# Patient Record
Sex: FEMALE | Race: WHITE | NOT HISPANIC OR LATINO
[De-identification: names, ages, dates, MRNs, and addresses within clinical notes are randomized per-mention and may not be internally consistent; named-entity substitution may affect disease eponyms.]

---

## 2023-12-19 PROBLEM — Z00.00 ENCOUNTER FOR PREVENTIVE HEALTH EXAMINATION: Status: ACTIVE | Noted: 2023-12-19

## 2024-01-01 DIAGNOSIS — Z86.59 PERSONAL HISTORY OF OTHER MENTAL AND BEHAVIORAL DISORDERS: ICD-10-CM

## 2024-01-16 DIAGNOSIS — I25.10 ATHEROSCLEROTIC HEART DISEASE OF NATIVE CORONARY ARTERY W/OUT ANGINA PECTORIS: ICD-10-CM

## 2024-01-16 DIAGNOSIS — I25.84 ATHEROSCLEROTIC HEART DISEASE OF NATIVE CORONARY ARTERY W/OUT ANGINA PECTORIS: ICD-10-CM

## 2024-03-06 ENCOUNTER — APPOINTMENT (OUTPATIENT)
Dept: CARDIOLOGY | Facility: CLINIC | Age: 69
End: 2024-03-06
Payer: MEDICARE

## 2024-03-06 VITALS
SYSTOLIC BLOOD PRESSURE: 138 MMHG | HEART RATE: 74 BPM | WEIGHT: 165 LBS | DIASTOLIC BLOOD PRESSURE: 80 MMHG | OXYGEN SATURATION: 100 %

## 2024-03-06 DIAGNOSIS — Z87.39 PERSONAL HISTORY OF OTHER DISEASES OF THE MUSCULOSKELETAL SYSTEM AND CONNECTIVE TISSUE: ICD-10-CM

## 2024-03-06 DIAGNOSIS — Z86.59 PERSONAL HISTORY OF OTHER MENTAL AND BEHAVIORAL DISORDERS: ICD-10-CM

## 2024-03-06 DIAGNOSIS — Z82.49 FAMILY HISTORY OF ISCHEMIC HEART DISEASE AND OTHER DISEASES OF THE CIRCULATORY SYSTEM: ICD-10-CM

## 2024-03-06 DIAGNOSIS — F17.200 NICOTINE DEPENDENCE, UNSPECIFIED, UNCOMPLICATED: ICD-10-CM

## 2024-03-06 DIAGNOSIS — Z86.39 PERSONAL HISTORY OF OTHER ENDOCRINE, NUTRITIONAL AND METABOLIC DISEASE: ICD-10-CM

## 2024-03-06 DIAGNOSIS — Z87.09 PERSONAL HISTORY OF OTHER DISEASES OF THE RESPIRATORY SYSTEM: ICD-10-CM

## 2024-03-06 PROCEDURE — 99203 OFFICE O/P NEW LOW 30 MIN: CPT | Mod: 25

## 2024-03-06 PROCEDURE — 93000 ELECTROCARDIOGRAM COMPLETE: CPT

## 2024-03-06 PROCEDURE — 99406 BEHAV CHNG SMOKING 3-10 MIN: CPT

## 2024-03-08 ENCOUNTER — NON-APPOINTMENT (OUTPATIENT)
Age: 69
End: 2024-03-08

## 2024-03-08 PROBLEM — F17.200 CURRENT SMOKER: Status: ACTIVE | Noted: 2024-03-08

## 2024-03-08 PROBLEM — Z87.09 HISTORY OF CHRONIC OBSTRUCTIVE LUNG DISEASE: Status: RESOLVED | Noted: 2024-03-08 | Resolved: 2024-03-08

## 2024-03-08 PROBLEM — Z86.59 HISTORY OF ANXIETY: Status: RESOLVED | Noted: 2024-03-08 | Resolved: 2024-03-08

## 2024-03-08 PROBLEM — Z86.39 HISTORY OF HYPOTHYROIDISM: Status: RESOLVED | Noted: 2024-03-08 | Resolved: 2024-03-08

## 2024-03-08 PROBLEM — Z82.49 FAMILY HISTORY OF ACUTE MYOCARDIAL INFARCTION: Status: ACTIVE | Noted: 2024-03-08

## 2024-03-08 PROBLEM — Z86.39 HISTORY OF HYPERLIPIDEMIA: Status: RESOLVED | Noted: 2024-03-08 | Resolved: 2024-03-08

## 2024-03-08 PROBLEM — Z87.39 HISTORY OF OSTEOPOROSIS: Status: RESOLVED | Noted: 2024-03-08 | Resolved: 2024-03-08

## 2024-03-08 PROBLEM — Z82.49 FAMILY HISTORY OF CORONARY ARTERY DISEASE: Status: ACTIVE | Noted: 2024-03-08

## 2024-03-08 RX ORDER — PANTOPRAZOLE 40 MG/1
TABLET, DELAYED RELEASE ORAL
Refills: 0 | Status: ACTIVE | COMMUNITY

## 2024-03-08 RX ORDER — BREXPIPRAZOLE 1 MG/1
1 TABLET ORAL
Refills: 0 | Status: ACTIVE | COMMUNITY

## 2024-03-08 RX ORDER — GLUCOSAMINE HCL/CHONDROITIN SU 500-400 MG
3 CAPSULE ORAL
Refills: 0 | Status: ACTIVE | COMMUNITY

## 2024-03-08 RX ORDER — ALBUTEROL SULFATE 90 UG/1
108 (90 BASE) INHALANT RESPIRATORY (INHALATION)
Refills: 0 | Status: ACTIVE | COMMUNITY

## 2024-03-08 RX ORDER — PROPRANOLOL HYDROCHLORIDE 40 MG/1
50 TABLET ORAL
Refills: 0 | Status: ACTIVE | COMMUNITY

## 2024-03-08 RX ORDER — CHROMIUM 200 MCG
1000 TABLET ORAL
Refills: 0 | Status: ACTIVE | COMMUNITY

## 2024-03-08 RX ORDER — ROSUVASTATIN CALCIUM 5 MG/1
TABLET, FILM COATED ORAL
Refills: 0 | Status: ACTIVE | COMMUNITY

## 2024-03-08 RX ORDER — TRAMADOL HYDROCHLORIDE 25 MG/1
TABLET, COATED ORAL
Refills: 0 | Status: ACTIVE | COMMUNITY

## 2024-03-08 RX ORDER — LORAZEPAM 1 MG/1
1 TABLET ORAL
Refills: 0 | Status: ACTIVE | COMMUNITY

## 2024-03-08 RX ORDER — LEVOTHYROXINE SODIUM 137 UG/1
TABLET ORAL
Refills: 0 | Status: ACTIVE | COMMUNITY

## 2024-03-08 RX ORDER — ATENOLOL 25 MG/1
25 TABLET ORAL
Qty: 5 | Refills: 0 | Status: ACTIVE | COMMUNITY
Start: 2024-03-08 | End: 1900-01-01

## 2024-03-08 RX ORDER — ASCORBIC ACID 500 MG
TABLET ORAL
Refills: 0 | Status: ACTIVE | COMMUNITY

## 2024-03-08 RX ORDER — FLUTICASONE FUROATE, UMECLIDINIUM BROMIDE AND VILANTEROL TRIFENATATE 200; 62.5; 25 UG/1; UG/1; UG/1
POWDER RESPIRATORY (INHALATION)
Refills: 0 | Status: ACTIVE | COMMUNITY

## 2024-03-08 RX ORDER — VITAMIN E 268 MG
CAPSULE ORAL
Refills: 0 | Status: ACTIVE | COMMUNITY

## 2024-03-08 RX ORDER — VENLAFAXINE HCL 50 MG
TABLET ORAL
Refills: 0 | Status: ACTIVE | COMMUNITY

## 2024-03-08 RX ORDER — FOLIC ACID 1 MG/1
1 TABLET ORAL
Refills: 0 | Status: ACTIVE | COMMUNITY

## 2024-03-08 RX ORDER — FLUTICASONE PROPIONATE 50 UG/1
50 SPRAY, METERED NASAL
Refills: 0 | Status: ACTIVE | COMMUNITY

## 2024-03-08 NOTE — DISCUSSION/SUMMARY
[FreeTextEntry1] : Chest pain The working diagnosis is chest pain secondary to reflux esophagitis.  The history is consistent with this diagnosis. The resting 3/6/24 electrocardiogram shows no evidence of myocardial ischemia or infarction. However the patient has multiple risk factors for the development of atherosclerotic heart disease.  A  12/23 chest CAT scan showed coronary artery calcifications.  As such, the differential diagnosis would include myocardial ischemia/atherosclerotic heart disease.  A noninvasive ischemic evaluation would be helpful for management decisions.  I have recommended the following: a. CT coronary angiogram b .Echocardiogram   Cardiomegaly An 11/23 chest x-ray and evaluation of a cough revealed mild cardiomegaly.  The presence of cardiomegaly would be suggestive of left ventricular systolic dysfunction.  However the history and physical findings do not support this diagnosis.  Echocardiography will be helpful to better assess heart   dimensions and function.  I have recommended following: Echocardiogram CT coronary angiogram   Coronary artery calcifications.    The 12/23 chest CAT scan showed evidence of coronary artery calcifications.  The working diagnosis is atherosclerotic heart disease with preserved left ventricular systolic function.  The patient has multiple risk factors for the development of ischemic heart disease.  I have recommended the following: Echocardiogram CT coronary angiogram.   Hyperlipidemia Hyperlipidemia represents a risk factor for progressive atherosclerotic disease.  The target LDL level for primary prevention is about 100.  If coronary disease is documented by noninvasive studies the target LDL level would then become 70/minute.  The dose of rosuvastatin may be increased if required to obtain optimal levels. The most recent lipid levels are not available for review.  Nonpharmacological therapy, specifically diet and exercise are emphasized  as major aspects of treatment.  I have recommended the following: Low-fat low-cholesterol heart healthy diet.  Regular aerobic exercise Continue the present medical regimen Target LDL level to about 100 (or 70 if atherosclerotic disease is documented) Increase rosuvastatin dose if required to obtain optimal levels as discussed above   Shortness of breath The working diagnosis is dyspnea secondary to chronic obstructive lung disease and smoking.  The history is compelling for this diagnosis.  The differential diagnosis would include left ventricular systolic dysfunction.  The history and physical findings do not support a diagnosis of pulmonary venous congestion/heart failure.  I have recommended the following: Echocardiogram   Nicotine dependence Smoking cigarettes exacerbates Geraldo's  cardiovascular issues.  Complete cessation of smoking is advised.     The diagnosis, prognosis, risks, options and alternatives were explained at length to the patient.  All questions were answered issues discussed included cardiomegaly nicotine dependence atherosclerotic heart disease coronary artery calcifications hyperlipidemia and  noninvasive cardiac testing

## 2024-03-08 NOTE — PHYSICAL EXAM
[Normal Conjunctiva] : normal conjunctiva [Normal S1, S2] : normal S1, S2 [Soft] : abdomen soft [Non Tender] : non-tender [Normal Gait] : normal gait [Normal Bowel Sounds] : normal bowel sounds [No Rash] : no rash [No Focal Deficits] : no focal deficits [de-identified] : Appears in no distress lying flat [de-identified] : No neck vein distention.  No carotid bruit [de-identified] : No murmur.  No gallop.  No diastolic sounds. [de-identified] :  Poor air exchange.  No rales.  No wheezing [de-identified] : Full range of motion [de-identified] : No peripheral edema.  Dorsalis pedis pulses +2 bilaterally no ulcerations [de-identified] : pleasant

## 2024-03-08 NOTE — HISTORY OF PRESENT ILLNESS
[FreeTextEntry1] : 69-year-old female Cardiology consultation requested by Dr. ELDER Rose because of risk factors for atherosclerotic heart disease and cardiomegaly on a chest x-ray.   Ms. Roldan has no known heart disease.  There is no history of a myocardial infarction angina or congestive heart failure.  In 11/23 She was seen by Dr. ELDER Rose at urgent care. because of a cough. A chest x-ray report indicated mild cardiomegaly.  A 12/23 chest CAT scan in  evaluation of a pulmonary nodule showed coronary artery calcifications.  A lingular 0.5 cm solid lung nodule was reported.  Ms. Roldan carries a diagnosis of chronic obstructive lung disease and smokes 1 pack of cigarettes daily.  She continues to smoke at the present time.  There is no history of diabetes.  There is a prior history of  hyperlipidemia  Geraldo experiences chest discomfort at rest which she attributes to gastroesophageal reflux/hiatal hernia.  Ms. Roldan reports dyspnea on exertion..  Ms. Roldan presents today for cardiovascular evaluation

## 2024-04-19 ENCOUNTER — APPOINTMENT (OUTPATIENT)
Dept: THORACIC SURGERY | Facility: CLINIC | Age: 69
End: 2024-04-19
Payer: MEDICARE

## 2024-04-19 VITALS — HEIGHT: 63.5 IN | WEIGHT: 169 LBS | BODY MASS INDEX: 29.57 KG/M2

## 2024-04-19 PROCEDURE — ACP01: CPT | Mod: NC

## 2024-05-04 ENCOUNTER — TRANSCRIPTION ENCOUNTER (OUTPATIENT)
Age: 69
End: 2024-05-04

## 2024-05-17 ENCOUNTER — APPOINTMENT (OUTPATIENT)
Dept: CARDIOLOGY | Facility: CLINIC | Age: 69
End: 2024-05-17
Payer: MEDICARE

## 2024-05-17 PROCEDURE — 93306 TTE W/DOPPLER COMPLETE: CPT

## 2024-05-28 ENCOUNTER — APPOINTMENT (OUTPATIENT)
Dept: CARDIOLOGY | Facility: CLINIC | Age: 69
End: 2024-05-28
Payer: MEDICARE

## 2024-05-28 VITALS
WEIGHT: 170 LBS | HEART RATE: 93 BPM | OXYGEN SATURATION: 97 % | SYSTOLIC BLOOD PRESSURE: 129 MMHG | DIASTOLIC BLOOD PRESSURE: 73 MMHG | BODY MASS INDEX: 29.64 KG/M2

## 2024-05-28 DIAGNOSIS — I51.7 CARDIOMEGALY: ICD-10-CM

## 2024-05-28 DIAGNOSIS — R07.9 CHEST PAIN, UNSPECIFIED: ICD-10-CM

## 2024-05-28 DIAGNOSIS — E78.5 HYPERLIPIDEMIA, UNSPECIFIED: ICD-10-CM

## 2024-05-28 DIAGNOSIS — I25.10 ATHEROSCLEROTIC HEART DISEASE OF NATIVE CORONARY ARTERY W/OUT ANGINA PECTORIS: ICD-10-CM

## 2024-05-28 DIAGNOSIS — I25.84 ATHEROSCLEROTIC HEART DISEASE OF NATIVE CORONARY ARTERY W/OUT ANGINA PECTORIS: ICD-10-CM

## 2024-05-28 DIAGNOSIS — I38 ENDOCARDITIS, VALVE UNSPECIFIED: ICD-10-CM

## 2024-05-28 DIAGNOSIS — R06.02 SHORTNESS OF BREATH: ICD-10-CM

## 2024-05-28 DIAGNOSIS — Z86.79 PERSONAL HISTORY OF OTHER DISEASES OF THE CIRCULATORY SYSTEM: ICD-10-CM

## 2024-05-28 DIAGNOSIS — F17.209 NICOTINE DEPENDENCE, UNSPECIFIED, WITH UNSPECIFIED NICOTINE-INDUCED DISORDERS: ICD-10-CM

## 2024-05-28 DIAGNOSIS — E66.3 OVERWEIGHT: ICD-10-CM

## 2024-05-28 PROCEDURE — 99406 BEHAV CHNG SMOKING 3-10 MIN: CPT

## 2024-05-28 PROCEDURE — 99213 OFFICE O/P EST LOW 20 MIN: CPT | Mod: 25

## 2024-06-02 PROBLEM — I25.10 CORONARY ARTERY CALCIFICATION: Status: ACTIVE | Noted: 2024-03-05

## 2024-06-02 PROBLEM — E78.5 HYPERLIPIDEMIA: Status: ACTIVE | Noted: 2024-03-06

## 2024-06-02 PROBLEM — I51.7 CARDIOMEGALY: Status: ACTIVE | Noted: 2024-03-06

## 2024-06-02 PROBLEM — I38 VALVULAR HEART DISEASE: Status: ACTIVE | Noted: 2024-06-02

## 2024-06-02 PROBLEM — E66.3 OVERWEIGHT: Status: RESOLVED | Noted: 2024-06-02 | Resolved: 2024-06-02

## 2024-06-02 PROBLEM — Z86.79 HISTORY OF VALVULAR HEART DISEASE: Status: RESOLVED | Noted: 2024-06-02 | Resolved: 2024-06-02

## 2024-06-02 PROBLEM — R06.02 SHORTNESS OF BREATH: Status: ACTIVE | Noted: 2024-03-06

## 2024-06-02 PROBLEM — F17.209 NICOTINE DEPENDENCE WITH NICOTINE-INDUCED DISORDER, UNSPECIFIED NICOTINE PRODUCT TYPE: Status: ACTIVE | Noted: 2024-03-08

## 2024-06-02 PROBLEM — R07.9 CHEST PAIN, UNSPECIFIED TYPE: Status: ACTIVE | Noted: 2024-03-06

## 2024-06-02 NOTE — DISCUSSION/SUMMARY
[FreeTextEntry1] : Chest pain The working diagnosis is chest pain secondary to reflux esophagitis.  The history is consistent with this diagnosis. The resting 3/6/24 electrocardiogram shows no evidence of myocardial ischemia or infarction. However the patient has multiple risk factors for the development of atherosclerotic heart disease.  A  12/23 chest CAT scan showed coronary artery calcifications.  As such, the differential diagnosis would include myocardial ischemia/atherosclerotic heart disease.  A noninvasive ischemic evaluation would be helpful for management decisions.  I have recommended the following: a. CT coronary angiogram    Cardiomegaly An 11/23 chest x-ray in  evaluation of a cough revealed mild cardiomegaly.  The presence of cardiomegaly would be suggestive of left ventricular systolic dysfunction.  However the history and physical findings do not support this diagnosis. The 5/24 echocardiogram confirmed normal left ventricular size and function.  The left ventricular end-diastolic dimension was 4.7 cm.  The left ventricular ejection fraction was 60%..  I have recommended following: No further cardiac testing for this problem    Coronary artery calcifications.   The 12/23 chest CAT scan showed evidence of coronary artery calcifications.  The working diagnosis is atherosclerotic heart disease with preserved left ventricular systolic function.  The patient has multiple risk factors for the development of ischemic heart disease.  I have recommended the following: CT coronary angiogram.    Hyperlipidemia Hyperlipidemia represents a risk factor for progressive atherosclerotic disease.  The target LDL level for primary prevention is about 100.  If coronary disease is documented by noninvasive studies the target LDL level would then become 70/minute.  The dose of rosuvastatin may be increased if required to obtain optimal levels. The most recent lipid levels are not available for review.  Nonpharmacological therapy, specifically diet and exercise are emphasized  as major aspects of treatment.  I have recommended the following: Low-fat low-cholesterol heart healthy diet.  Regular aerobic exercise Continue the present medical regimen Target LDL level to about 100 (or 70 if atherosclerotic disease is documented) Increase rosuvastatin dose if required to obtain optimal levels as discussed above Lipid profile laboratory study is ordered   Shortness of breath The working diagnosis is dyspnea secondary to chronic obstructive lung disease and smoking.  The history is compelling for this diagnosis.  The differential diagnosis would include  myocardial ischemia/atherosclerotic heart disease.  .  The history and physical findings do not support a diagnosis of pulmonary venous congestion/heart failure.  I have recommended the following: CT coronary angiogram  (see chest pain entry above)   Nicotine dependence Smoking cigarettes exacerbates Geraldo's  cardiovascular issues.  Complete cessation of smoking is advised.   Valvular heart disease The 5/24 echocardiogram revealed mild mitral regurgitation.  The pulmonary artery systolic pressure was normal at 25 mmHg.  Left ventricular ejection fraction was 60%.  The present cardiac physical examination is not suggestive of hemodynamically significant valvular pathology.  I have recommended the following: No further cardiac testing for this problem  Overweight Being overweight exacerbates Geraldo's cardiovascular issues.  Today Ms. Roldan is 5 feet 4 inches tall and weighs 170 pounds.  Diet exercise and weight loss are advised  The diagnosis, prognosis, risks, options and alternatives were explained at length to the patient.  All questions were answered issues discussed included cardiomegaly nicotine dependence atherosclerotic heart disease coronary artery calcifications hyperlipidemia and  noninvasive cardiac testing.  Counseling and/or coordination of care Time was a significant factor for this patient encounter.  Total time spent with the patient was 25 minutes.  Greater than 50% of the time was devoted to counseling and/or coordination of care

## 2024-06-02 NOTE — HISTORY OF PRESENT ILLNESS
[FreeTextEntry1] : 69-year-old female Routine follow-up for multiple risk factors for development of atherosclerotic heart disease chest pain hyperlipidemia nicotine dependence and shortness of breath..  Ms. Roldan complains of anterior chest discomfort described as a "burning sensation" which she ascribes to "my hiatal hernia."  No exertional chest pain.

## 2024-07-23 ENCOUNTER — LABORATORY RESULT (OUTPATIENT)
Age: 69
End: 2024-07-23

## 2024-07-31 ENCOUNTER — RESULT REVIEW (OUTPATIENT)
Age: 69
End: 2024-07-31

## 2024-08-03 DIAGNOSIS — I25.10 ATHEROSCLEROTIC HEART DISEASE OF NATIVE CORONARY ARTERY W/OUT ANGINA PECTORIS: ICD-10-CM

## 2024-08-03 DIAGNOSIS — Z86.79 PERSONAL HISTORY OF OTHER DISEASES OF THE CIRCULATORY SYSTEM: ICD-10-CM

## 2024-08-27 ENCOUNTER — APPOINTMENT (OUTPATIENT)
Dept: CARDIOLOGY | Facility: CLINIC | Age: 69
End: 2024-08-27

## 2024-08-27 VITALS
BODY MASS INDEX: 30.12 KG/M2 | HEIGHT: 63 IN | HEART RATE: 69 BPM | SYSTOLIC BLOOD PRESSURE: 120 MMHG | OXYGEN SATURATION: 97 % | DIASTOLIC BLOOD PRESSURE: 77 MMHG | WEIGHT: 170 LBS

## 2024-08-27 DIAGNOSIS — Z87.891 PERSONAL HISTORY OF NICOTINE DEPENDENCE: ICD-10-CM

## 2024-08-27 DIAGNOSIS — R06.02 SHORTNESS OF BREATH: ICD-10-CM

## 2024-08-27 DIAGNOSIS — I38 ENDOCARDITIS, VALVE UNSPECIFIED: ICD-10-CM

## 2024-08-27 DIAGNOSIS — E78.5 HYPERLIPIDEMIA, UNSPECIFIED: ICD-10-CM

## 2024-08-27 DIAGNOSIS — F17.209 NICOTINE DEPENDENCE, UNSPECIFIED, WITH UNSPECIFIED NICOTINE-INDUCED DISORDERS: ICD-10-CM

## 2024-08-27 DIAGNOSIS — E66.3 OVERWEIGHT: ICD-10-CM

## 2024-08-27 DIAGNOSIS — I25.10 ATHEROSCLEROTIC HEART DISEASE OF NATIVE CORONARY ARTERY W/OUT ANGINA PECTORIS: ICD-10-CM

## 2024-08-27 DIAGNOSIS — I51.7 CARDIOMEGALY: ICD-10-CM

## 2024-08-27 DIAGNOSIS — Z86.79 PERSONAL HISTORY OF OTHER DISEASES OF THE CIRCULATORY SYSTEM: ICD-10-CM

## 2024-08-27 PROCEDURE — 99406 BEHAV CHNG SMOKING 3-10 MIN: CPT

## 2024-08-27 PROCEDURE — 99214 OFFICE O/P EST MOD 30 MIN: CPT | Mod: 25

## 2024-08-28 PROBLEM — I25.10 CORONARY ARTERY CALCIFICATION: Status: RESOLVED | Noted: 2024-01-06 | Resolved: 2024-08-28

## 2024-08-28 PROBLEM — I25.10 CORONARY ARTERY DISEASE: Status: ACTIVE | Noted: 2024-08-28

## 2024-08-28 PROBLEM — Z87.891 HISTORY OF NICOTINE DEPENDENCE: Status: RESOLVED | Noted: 2024-08-28 | Resolved: 2024-08-28

## 2024-08-28 PROBLEM — Z86.79 HISTORY OF CORONARY ARTERY DISEASE: Status: RESOLVED | Noted: 2024-08-03 | Resolved: 2024-08-28

## 2024-08-28 PROBLEM — E66.3 OVERWEIGHT: Status: ACTIVE | Noted: 2024-08-28

## 2024-08-28 RX ORDER — ASPIRIN 325 MG/1
TABLET, FILM COATED ORAL
Refills: 0 | Status: ACTIVE | COMMUNITY

## 2024-08-28 NOTE — DISCUSSION/SUMMARY
[FreeTextEntry1] : Atherosclerotic heart disease Atherosclerotic heart disease is stable.  A 7/24 CT coronary angiogram study revealed a total coronary calcium score of 382 Agatston units percentile rank 91.  The left main had a less than 25% stenosis.  The LAD the proximal and mid 1-29% The left circumflex was patent.  The RCA had proximal mid and distal 30-49% lesions. The resting 3/24 electrocardiogram shows no evidence of myocardial ischemia or infarction. Left ventricular systolic function is normal as reflected by left ventricular ejection fraction of 60% on the 5/24 echocardiogram. In view of the lack of angina and above noninvasive studies continued medical management is indicated at this time.  The prognosis is good .  Measures to control modifiable risk factors for the development of atherosclerotic disease will be important in long-term management.   I have recommended the following Risk factor modification as discussed above with particular attention to smoking cessation Continue the  present medical regimen Further cardiac testing is not required at this time    Cardiomegaly An 11/23 chest x-ray in  evaluation of a cough revealed mild cardiomegaly.  The presence of cardiomegaly would be suggestive of left ventricular systolic dysfunction.  However the history and physical findings do not support this diagnosis. The 5/24 echocardiogram confirmed normal left ventricular size and function.  The left ventricular end-diastolic dimension was 4.7 cm.  The left ventricular ejection fraction was 60%..  I have recommended following: No further cardiac testing for this problem    .    Hyperlipidemia Hyperlipidemia represents a risk factor for progressive atherosclerotic disease.  The target LDL level with known atherosclerotic heart disease  is about  70.  In 7/24 the serum triglyceride level was 163 cholesterol 199 HDL 65 and .  The dose of rosuvastatin was then increased from 5 mg/day to the present 10 mg/day.  The dose of rosuvastatin may be further increased if required to obtain optimal levels .  Nonpharmacological therapy, specifically diet and exercise are emphasized  as major aspects of treatment.  I have recommended the following: Low-fat low-cholesterol heart healthy diet.  Regular aerobic exercise Continue the present medical regimen Target LDL level to about  70  as noted above Increase rosuvastatin dose if required to obtain optimal levels as discussed above Routine laboratory studies including lipid profile through primary care    Shortness of breath The working diagnosis is dyspnea secondary to chronic obstructive lung disease and smoking.  The history is compelling for this diagnosis.  The differential diagnosis would include  myocardial ischemia/atherosclerotic heart disease.  .  The history and physical findings do not support a diagnosis of pulmonary venous congestion/heart failure.  I have recommended the following: Complete cessation of smoking Follow-up with pulmonary medicine Dr. Lutz   Nicotine dependence Smoking cigarettes exacerbates Geraldo's  cardiovascular issues.  Complete cessation of smoking is advised.   Valvular heart disease The 5/24 echocardiogram revealed mild mitral regurgitation.  The pulmonary artery systolic pressure was normal at 25 mmHg.  Left ventricular ejection fraction was 60%.  The present cardiac physical examination is not suggestive of hemodynamically significant valvular pathology.  I have recommended the following: No further cardiac testing for this problem  Overweight Being overweight exacerbates Geraldo's cardiovascular issues.  Today Ms. Roldan is 5 feet 4 inches tall and weighs 168 pounds.  Diet exercise and weight loss are advised   The diagnosis, prognosis, risks, options and alternatives were explained at length to the patient.  All questions were answered issues discussed included cardiomegaly nicotine dependence atherosclerotic heart disease coronary artery calcifications hyperlipidemia and  noninvasive cardiac testing.  Counseling and/or coordination of care Time was a significant factor for this patient encounter.  Total time spent with the patient was  30  minutes.  Greater than 50% of the time was devoted to counseling and/or coordination of care

## 2024-08-28 NOTE — PHYSICAL EXAM
[Normal Conjunctiva] : normal conjunctiva [Normal S1, S2] : normal S1, S2 [Soft] : abdomen soft [Non Tender] : non-tender [Normal Bowel Sounds] : normal bowel sounds [Normal Gait] : normal gait [No Rash] : no rash [No Focal Deficits] : no focal deficits [de-identified] : Appears in no distress lying flat [de-identified] : No murmur.  No gallop.  No diastolic sounds. [de-identified] : No neck vein distention.  No carotid bruit [de-identified] :  Poor air exchange.  No rales.  No wheezing [de-identified] : Full range of motion [de-identified] : No peripheral edema.  Dorsalis pedis pulses +2 bilaterally no ulcerations [de-identified] : pleasant

## 2024-08-28 NOTE — PHYSICAL EXAM
[Normal Conjunctiva] : normal conjunctiva [Normal S1, S2] : normal S1, S2 [Soft] : abdomen soft [Non Tender] : non-tender [Normal Bowel Sounds] : normal bowel sounds [Normal Gait] : normal gait [No Rash] : no rash [No Focal Deficits] : no focal deficits [de-identified] : Appears in no distress lying flat [de-identified] : No neck vein distention.  No carotid bruit [de-identified] : No murmur.  No gallop.  No diastolic sounds. [de-identified] :  Poor air exchange.  No rales.  No wheezing [de-identified] : Full range of motion [de-identified] : No peripheral edema.  Dorsalis pedis pulses +2 bilaterally no ulcerations [de-identified] : pleasant

## 2024-08-28 NOTE — HISTORY OF PRESENT ILLNESS
[FreeTextEntry1] : 69-year-old female Routine follow-up for atherosclerotic heart disease nicotine dependence hyperlipidemia "I feel okay."  Geraldo denies exertional chest pain shortness of breath at rest palpitations or syncope.   Dyspnea on exertion is not progressive severe or different from in the past. She continues to struggle with her nicotine dependence but has been unable to stop smoking.

## 2025-02-18 ENCOUNTER — NON-APPOINTMENT (OUTPATIENT)
Age: 70
End: 2025-02-18

## 2025-02-18 ENCOUNTER — APPOINTMENT (OUTPATIENT)
Dept: CARDIOLOGY | Facility: CLINIC | Age: 70
End: 2025-02-18
Payer: MEDICARE

## 2025-02-18 VITALS
HEART RATE: 87 BPM | DIASTOLIC BLOOD PRESSURE: 76 MMHG | WEIGHT: 161 LBS | BODY MASS INDEX: 28.52 KG/M2 | SYSTOLIC BLOOD PRESSURE: 135 MMHG | OXYGEN SATURATION: 99 %

## 2025-02-18 DIAGNOSIS — Z82.49 FAMILY HISTORY OF ISCHEMIC HEART DISEASE AND OTHER DISEASES OF THE CIRCULATORY SYSTEM: ICD-10-CM

## 2025-02-18 DIAGNOSIS — I25.10 ATHEROSCLEROTIC HEART DISEASE OF NATIVE CORONARY ARTERY W/OUT ANGINA PECTORIS: ICD-10-CM

## 2025-02-18 DIAGNOSIS — F17.209 NICOTINE DEPENDENCE, UNSPECIFIED, WITH UNSPECIFIED NICOTINE-INDUCED DISORDERS: ICD-10-CM

## 2025-02-18 DIAGNOSIS — Z87.19 PERSONAL HISTORY OF OTHER DISEASES OF THE DIGESTIVE SYSTEM: ICD-10-CM

## 2025-02-18 DIAGNOSIS — E53.8 DEFICIENCY OF OTHER SPECIFIED B GROUP VITAMINS: ICD-10-CM

## 2025-02-18 DIAGNOSIS — I51.7 CARDIOMEGALY: ICD-10-CM

## 2025-02-18 DIAGNOSIS — E78.5 HYPERLIPIDEMIA, UNSPECIFIED: ICD-10-CM

## 2025-02-18 DIAGNOSIS — E66.3 OVERWEIGHT: ICD-10-CM

## 2025-02-18 DIAGNOSIS — I38 ENDOCARDITIS, VALVE UNSPECIFIED: ICD-10-CM

## 2025-02-18 DIAGNOSIS — R06.02 SHORTNESS OF BREATH: ICD-10-CM

## 2025-02-18 DIAGNOSIS — Z86.39 PERSONAL HISTORY OF OTHER ENDOCRINE, NUTRITIONAL AND METABOLIC DISEASE: ICD-10-CM

## 2025-02-18 DIAGNOSIS — Z87.898 PERSONAL HISTORY OF OTHER SPECIFIED CONDITIONS: ICD-10-CM

## 2025-02-18 DIAGNOSIS — R91.1 SOLITARY PULMONARY NODULE: ICD-10-CM

## 2025-02-18 PROCEDURE — 93000 ELECTROCARDIOGRAM COMPLETE: CPT

## 2025-02-18 PROCEDURE — 99214 OFFICE O/P EST MOD 30 MIN: CPT | Mod: 25

## 2025-02-18 PROCEDURE — 99406 BEHAV CHNG SMOKING 3-10 MIN: CPT

## 2025-02-20 PROBLEM — E53.8 VITAMIN B 12 DEFICIENCY: Status: RESOLVED | Noted: 2025-02-20 | Resolved: 2025-02-20

## 2025-02-20 PROBLEM — Z86.39 HISTORY OF VITAMIN D DEFICIENCY: Status: RESOLVED | Noted: 2025-02-20 | Resolved: 2025-02-20

## 2025-02-20 PROBLEM — Z87.19 HISTORY OF GASTROESOPHAGEAL REFLUX (GERD): Status: RESOLVED | Noted: 2025-02-20 | Resolved: 2025-02-20

## 2025-02-20 PROBLEM — R91.1 PULMONARY NODULE: Status: RESOLVED | Noted: 2025-02-20 | Resolved: 2025-02-20

## 2025-02-20 PROBLEM — Z87.898 HISTORY OF INSOMNIA: Status: RESOLVED | Noted: 2025-02-20 | Resolved: 2025-02-20

## 2025-02-20 PROBLEM — Z82.49 FAMILY HISTORY OF CAROTID ARTERY STENOSIS: Status: ACTIVE | Noted: 2025-02-20

## 2025-02-20 PROBLEM — Z82.49 FAMILY HISTORY OF PERIPHERAL VASCULAR DISEASE: Status: ACTIVE | Noted: 2025-02-20

## 2025-02-20 RX ORDER — FAMOTIDINE 10 MG/1
TABLET, FILM COATED ORAL
Refills: 0 | Status: ACTIVE | COMMUNITY

## 2025-02-20 RX ORDER — VILAZODONE HYDROCHLORIDE 40 MG/1
TABLET, FILM COATED ORAL
Refills: 0 | Status: ACTIVE | COMMUNITY

## 2025-02-20 RX ORDER — PNV NO.95/FERROUS FUM/FOLIC AC 28MG-0.8MG
TABLET ORAL
Refills: 0 | Status: ACTIVE | COMMUNITY

## 2025-02-20 RX ORDER — CALCIUM CARBONATE/VITAMIN D3 600 MG-10
TABLET ORAL
Refills: 0 | Status: ACTIVE | COMMUNITY

## 2025-02-20 RX ORDER — ONDANSETRON HYDROCHLORIDE 4 MG/1
TABLET, FILM COATED ORAL
Refills: 0 | Status: ACTIVE | COMMUNITY

## 2025-02-20 RX ORDER — CHROMIUM 200 MCG
TABLET ORAL
Refills: 0 | Status: ACTIVE | COMMUNITY

## 2025-02-20 RX ORDER — VITAMIN K2 90 MCG
CAPSULE ORAL
Refills: 0 | Status: ACTIVE | COMMUNITY

## 2025-02-20 RX ORDER — SUCRALFATE 1 G/1
TABLET ORAL
Refills: 0 | Status: ACTIVE | COMMUNITY

## 2025-06-16 ENCOUNTER — APPOINTMENT (OUTPATIENT)
Dept: PULMONOLOGY | Facility: CLINIC | Age: 70
End: 2025-06-16
Payer: MEDICARE

## 2025-06-16 VITALS — HEIGHT: 63 IN | WEIGHT: 160 LBS | BODY MASS INDEX: 28.35 KG/M2

## 2025-06-16 PROCEDURE — G0296 VISIT TO DETERM LDCT ELIG: CPT | Mod: 93

## 2025-06-25 ENCOUNTER — APPOINTMENT (OUTPATIENT)
Dept: CARDIOLOGY | Facility: CLINIC | Age: 70
End: 2025-06-25
Payer: MEDICARE

## 2025-06-25 VITALS
RESPIRATION RATE: 14 BRPM | HEIGHT: 63 IN | DIASTOLIC BLOOD PRESSURE: 68 MMHG | SYSTOLIC BLOOD PRESSURE: 110 MMHG | HEART RATE: 98 BPM | OXYGEN SATURATION: 97 % | BODY MASS INDEX: 28 KG/M2 | WEIGHT: 158 LBS

## 2025-06-25 PROCEDURE — 99214 OFFICE O/P EST MOD 30 MIN: CPT

## 2025-06-25 PROCEDURE — 93000 ELECTROCARDIOGRAM COMPLETE: CPT

## 2025-06-25 RX ORDER — VONOPRAZAN FUMARATE 26.72 MG/1
TABLET ORAL
Refills: 0 | Status: ACTIVE | COMMUNITY

## 2025-06-25 RX ORDER — OMEGA-3/DHA/EPA/FISH OIL 300-1000MG
CAPSULE ORAL
Refills: 0 | Status: ACTIVE | COMMUNITY

## 2025-06-25 RX ORDER — TENAPANOR HYDROCHLORIDE 53.2 MG/1
TABLET ORAL
Refills: 0 | Status: ACTIVE | COMMUNITY